# Patient Record
Sex: MALE | Race: NATIVE HAWAIIAN OR OTHER PACIFIC ISLANDER | NOT HISPANIC OR LATINO | Employment: FULL TIME | URBAN - METROPOLITAN AREA
[De-identification: names, ages, dates, MRNs, and addresses within clinical notes are randomized per-mention and may not be internally consistent; named-entity substitution may affect disease eponyms.]

---

## 2019-05-10 ENCOUNTER — APPOINTMENT (OUTPATIENT)
Dept: RADIOLOGY | Facility: MEDICAL CENTER | Age: 49
End: 2019-05-10
Attending: EMERGENCY MEDICINE

## 2019-05-10 ENCOUNTER — HOSPITAL ENCOUNTER (OUTPATIENT)
Facility: MEDICAL CENTER | Age: 49
End: 2019-05-12
Attending: EMERGENCY MEDICINE | Admitting: HOSPITALIST

## 2019-05-10 DIAGNOSIS — J18.9 PNEUMONIA OF RIGHT LOWER LOBE DUE TO INFECTIOUS ORGANISM: ICD-10-CM

## 2019-05-10 DIAGNOSIS — R11.2 NON-INTRACTABLE VOMITING WITH NAUSEA, UNSPECIFIED VOMITING TYPE: ICD-10-CM

## 2019-05-10 PROBLEM — A41.9 SEPSIS (HCC): Status: ACTIVE | Noted: 2019-05-10

## 2019-05-10 PROBLEM — R03.0 ELEVATED BLOOD PRESSURE READING: Status: ACTIVE | Noted: 2019-05-10

## 2019-05-10 LAB
ALBUMIN SERPL BCP-MCNC: 4.1 G/DL (ref 3.2–4.9)
ALBUMIN/GLOB SERPL: 1 G/DL
ALP SERPL-CCNC: 95 U/L (ref 30–99)
ALT SERPL-CCNC: 36 U/L (ref 2–50)
ANION GAP SERPL CALC-SCNC: 8 MMOL/L (ref 0–11.9)
APPEARANCE UR: CLEAR
AST SERPL-CCNC: 62 U/L (ref 12–45)
BACTERIA #/AREA URNS HPF: NEGATIVE /HPF
BASOPHILS # BLD AUTO: 0.4 % (ref 0–1.8)
BASOPHILS # BLD: 0.04 K/UL (ref 0–0.12)
BILIRUB SERPL-MCNC: 0.6 MG/DL (ref 0.1–1.5)
BILIRUB UR QL STRIP.AUTO: NEGATIVE
BUN SERPL-MCNC: 19 MG/DL (ref 8–22)
CALCIUM SERPL-MCNC: 9.3 MG/DL (ref 8.5–10.5)
CHLORIDE SERPL-SCNC: 100 MMOL/L (ref 96–112)
CO2 SERPL-SCNC: 26 MMOL/L (ref 20–33)
COLOR UR: YELLOW
CREAT SERPL-MCNC: 1.01 MG/DL (ref 0.5–1.4)
EOSINOPHIL # BLD AUTO: 0.08 K/UL (ref 0–0.51)
EOSINOPHIL NFR BLD: 0.7 % (ref 0–6.9)
EPI CELLS #/AREA URNS HPF: NEGATIVE /HPF
ERYTHROCYTE [DISTWIDTH] IN BLOOD BY AUTOMATED COUNT: 51.1 FL (ref 35.9–50)
FLUAV RNA SPEC QL NAA+PROBE: NEGATIVE
FLUBV RNA SPEC QL NAA+PROBE: NEGATIVE
GLOBULIN SER CALC-MCNC: 4.3 G/DL (ref 1.9–3.5)
GLUCOSE SERPL-MCNC: 112 MG/DL (ref 65–99)
GLUCOSE UR STRIP.AUTO-MCNC: NEGATIVE MG/DL
HCT VFR BLD AUTO: 44.9 % (ref 42–52)
HGB BLD-MCNC: 15.3 G/DL (ref 14–18)
HYALINE CASTS #/AREA URNS LPF: ABNORMAL /LPF
IMM GRANULOCYTES # BLD AUTO: 0.04 K/UL (ref 0–0.11)
IMM GRANULOCYTES NFR BLD AUTO: 0.4 % (ref 0–0.9)
KETONES UR STRIP.AUTO-MCNC: NEGATIVE MG/DL
LACTATE BLD-SCNC: 1.2 MMOL/L (ref 0.5–2)
LEUKOCYTE ESTERASE UR QL STRIP.AUTO: NEGATIVE
LYMPHOCYTES # BLD AUTO: 1.31 K/UL (ref 1–4.8)
LYMPHOCYTES NFR BLD: 11.6 % (ref 22–41)
MCH RBC QN AUTO: 32.3 PG (ref 27–33)
MCHC RBC AUTO-ENTMCNC: 34.1 G/DL (ref 33.7–35.3)
MCV RBC AUTO: 94.9 FL (ref 81.4–97.8)
MICRO URNS: ABNORMAL
MONOCYTES # BLD AUTO: 0.81 K/UL (ref 0–0.85)
MONOCYTES NFR BLD AUTO: 7.2 % (ref 0–13.4)
NEUTROPHILS # BLD AUTO: 8.98 K/UL (ref 1.82–7.42)
NEUTROPHILS NFR BLD: 79.7 % (ref 44–72)
NITRITE UR QL STRIP.AUTO: NEGATIVE
NRBC # BLD AUTO: 0 K/UL
NRBC BLD-RTO: 0 /100 WBC
PH UR STRIP.AUTO: 8 [PH]
PLATELET # BLD AUTO: 255 K/UL (ref 164–446)
PMV BLD AUTO: 8.3 FL (ref 9–12.9)
POTASSIUM SERPL-SCNC: 3.9 MMOL/L (ref 3.6–5.5)
PROCALCITONIN SERPL-MCNC: 0.07 NG/ML
PROT SERPL-MCNC: 8.4 G/DL (ref 6–8.2)
PROT UR QL STRIP: NEGATIVE MG/DL
RBC # BLD AUTO: 4.73 M/UL (ref 4.7–6.1)
RBC # URNS HPF: ABNORMAL /HPF
RBC UR QL AUTO: ABNORMAL
SODIUM SERPL-SCNC: 134 MMOL/L (ref 135–145)
SP GR UR STRIP.AUTO: 1.02
UROBILINOGEN UR STRIP.AUTO-MCNC: 1 MG/DL
WBC # BLD AUTO: 11.3 K/UL (ref 4.8–10.8)
WBC #/AREA URNS HPF: ABNORMAL /HPF

## 2019-05-10 PROCEDURE — 700111 HCHG RX REV CODE 636 W/ 250 OVERRIDE (IP): Performed by: HOSPITALIST

## 2019-05-10 PROCEDURE — 36415 COLL VENOUS BLD VENIPUNCTURE: CPT

## 2019-05-10 PROCEDURE — 99285 EMERGENCY DEPT VISIT HI MDM: CPT

## 2019-05-10 PROCEDURE — A9270 NON-COVERED ITEM OR SERVICE: HCPCS | Performed by: EMERGENCY MEDICINE

## 2019-05-10 PROCEDURE — 87040 BLOOD CULTURE FOR BACTERIA: CPT | Mod: 91

## 2019-05-10 PROCEDURE — 700111 HCHG RX REV CODE 636 W/ 250 OVERRIDE (IP): Performed by: EMERGENCY MEDICINE

## 2019-05-10 PROCEDURE — 71045 X-RAY EXAM CHEST 1 VIEW: CPT

## 2019-05-10 PROCEDURE — G0378 HOSPITAL OBSERVATION PER HR: HCPCS

## 2019-05-10 PROCEDURE — 87502 INFLUENZA DNA AMP PROBE: CPT

## 2019-05-10 PROCEDURE — 80053 COMPREHEN METABOLIC PANEL: CPT

## 2019-05-10 PROCEDURE — 96365 THER/PROPH/DIAG IV INF INIT: CPT

## 2019-05-10 PROCEDURE — 83605 ASSAY OF LACTIC ACID: CPT

## 2019-05-10 PROCEDURE — 74176 CT ABD & PELVIS W/O CONTRAST: CPT

## 2019-05-10 PROCEDURE — 87086 URINE CULTURE/COLONY COUNT: CPT

## 2019-05-10 PROCEDURE — 99220 PR INITIAL OBSERVATION CARE,LEVL III: CPT | Performed by: HOSPITALIST

## 2019-05-10 PROCEDURE — 700102 HCHG RX REV CODE 250 W/ 637 OVERRIDE(OP): Performed by: HOSPITALIST

## 2019-05-10 PROCEDURE — 84145 PROCALCITONIN (PCT): CPT

## 2019-05-10 PROCEDURE — 81001 URINALYSIS AUTO W/SCOPE: CPT

## 2019-05-10 PROCEDURE — 85025 COMPLETE CBC W/AUTO DIFF WBC: CPT

## 2019-05-10 PROCEDURE — 700102 HCHG RX REV CODE 250 W/ 637 OVERRIDE(OP): Performed by: EMERGENCY MEDICINE

## 2019-05-10 PROCEDURE — 700105 HCHG RX REV CODE 258: Performed by: EMERGENCY MEDICINE

## 2019-05-10 PROCEDURE — 96375 TX/PRO/DX INJ NEW DRUG ADDON: CPT

## 2019-05-10 PROCEDURE — 700105 HCHG RX REV CODE 258: Performed by: HOSPITALIST

## 2019-05-10 PROCEDURE — A9270 NON-COVERED ITEM OR SERVICE: HCPCS | Performed by: HOSPITALIST

## 2019-05-10 RX ORDER — AMOXICILLIN 250 MG
2 CAPSULE ORAL 2 TIMES DAILY
Status: DISCONTINUED | OUTPATIENT
Start: 2019-05-11 | End: 2019-05-12 | Stop reason: HOSPADM

## 2019-05-10 RX ORDER — SODIUM CHLORIDE 9 MG/ML
INJECTION, SOLUTION INTRAVENOUS CONTINUOUS
Status: DISPENSED | OUTPATIENT
Start: 2019-05-10 | End: 2019-05-11

## 2019-05-10 RX ORDER — POLYETHYLENE GLYCOL 3350 17 G/17G
1 POWDER, FOR SOLUTION ORAL
Status: DISCONTINUED | OUTPATIENT
Start: 2019-05-10 | End: 2019-05-12 | Stop reason: HOSPADM

## 2019-05-10 RX ORDER — GUAIFENESIN/DEXTROMETHORPHAN 100-10MG/5
10 SYRUP ORAL EVERY 6 HOURS PRN
Status: DISCONTINUED | OUTPATIENT
Start: 2019-05-10 | End: 2019-05-12 | Stop reason: HOSPADM

## 2019-05-10 RX ORDER — SODIUM CHLORIDE 9 MG/ML
500 INJECTION, SOLUTION INTRAVENOUS
Status: COMPLETED | OUTPATIENT
Start: 2019-05-10 | End: 2019-05-11

## 2019-05-10 RX ORDER — MORPHINE SULFATE 4 MG/ML
2-4 INJECTION, SOLUTION INTRAMUSCULAR; INTRAVENOUS EVERY 6 HOURS PRN
Status: DISCONTINUED | OUTPATIENT
Start: 2019-05-10 | End: 2019-05-12 | Stop reason: HOSPADM

## 2019-05-10 RX ORDER — ACETAMINOPHEN 500 MG
1000 TABLET ORAL ONCE
Status: COMPLETED | OUTPATIENT
Start: 2019-05-10 | End: 2019-05-10

## 2019-05-10 RX ORDER — TRAMADOL HYDROCHLORIDE 50 MG/1
50 TABLET ORAL EVERY 6 HOURS PRN
Status: DISCONTINUED | OUTPATIENT
Start: 2019-05-10 | End: 2019-05-12 | Stop reason: HOSPADM

## 2019-05-10 RX ORDER — AZITHROMYCIN 250 MG/1
250 TABLET, FILM COATED ORAL EVERY 24 HOURS
Status: DISCONTINUED | OUTPATIENT
Start: 2019-05-11 | End: 2019-05-11

## 2019-05-10 RX ORDER — BISACODYL 10 MG
10 SUPPOSITORY, RECTAL RECTAL
Status: DISCONTINUED | OUTPATIENT
Start: 2019-05-10 | End: 2019-05-12 | Stop reason: HOSPADM

## 2019-05-10 RX ORDER — ENALAPRILAT 1.25 MG/ML
1.25 INJECTION INTRAVENOUS EVERY 6 HOURS PRN
Status: DISCONTINUED | OUTPATIENT
Start: 2019-05-10 | End: 2019-05-12 | Stop reason: HOSPADM

## 2019-05-10 RX ORDER — AZITHROMYCIN 250 MG/1
500 TABLET, FILM COATED ORAL ONCE
Status: COMPLETED | OUTPATIENT
Start: 2019-05-10 | End: 2019-05-10

## 2019-05-10 RX ORDER — ACETAMINOPHEN 325 MG/1
650 TABLET ORAL EVERY 6 HOURS PRN
Status: DISCONTINUED | OUTPATIENT
Start: 2019-05-10 | End: 2019-05-12 | Stop reason: HOSPADM

## 2019-05-10 RX ORDER — ACETAMINOPHEN 325 MG/1
325 TABLET ORAL EVERY 6 HOURS PRN
COMMUNITY

## 2019-05-10 RX ORDER — SODIUM CHLORIDE 9 MG/ML
30 INJECTION, SOLUTION INTRAVENOUS ONCE
Status: COMPLETED | OUTPATIENT
Start: 2019-05-10 | End: 2019-05-10

## 2019-05-10 RX ORDER — AZITHROMYCIN 500 MG/1
500 INJECTION, POWDER, LYOPHILIZED, FOR SOLUTION INTRAVENOUS ONCE
Status: DISCONTINUED | OUTPATIENT
Start: 2019-05-10 | End: 2019-05-10

## 2019-05-10 RX ADMIN — TRAMADOL HYDROCHLORIDE 50 MG: 50 TABLET, FILM COATED ORAL at 22:25

## 2019-05-10 RX ADMIN — ACETAMINOPHEN 1000 MG: 500 TABLET ORAL at 16:04

## 2019-05-10 RX ADMIN — MORPHINE SULFATE 4 MG: 4 INJECTION INTRAVENOUS at 20:57

## 2019-05-10 RX ADMIN — SODIUM CHLORIDE: 9 INJECTION, SOLUTION INTRAVENOUS at 20:57

## 2019-05-10 RX ADMIN — AMPICILLIN AND SULBACTAM 3 G: 2; 1 INJECTION, POWDER, FOR SOLUTION INTRAVENOUS at 18:36

## 2019-05-10 RX ADMIN — SODIUM CHLORIDE 3840 ML: 9 INJECTION, SOLUTION INTRAVENOUS at 16:06

## 2019-05-10 RX ADMIN — AZITHROMYCIN 500 MG: 250 TABLET, FILM COATED ORAL at 18:53

## 2019-05-10 RX ADMIN — ACETAMINOPHEN 650 MG: 325 TABLET, FILM COATED ORAL at 22:25

## 2019-05-10 RX ADMIN — AMPICILLIN SODIUM AND SULBACTAM SODIUM 3 G: 2; 1 INJECTION, POWDER, FOR SOLUTION INTRAMUSCULAR; INTRAVENOUS at 23:47

## 2019-05-10 ASSESSMENT — ENCOUNTER SYMPTOMS
CHILLS: 1
VOMITING: 1
DIARRHEA: 0
SHORTNESS OF BREATH: 0
DEPRESSION: 0
WHEEZING: 0
FOCAL WEAKNESS: 0
PHOTOPHOBIA: 0
MYALGIAS: 0
COUGH: 0
ABDOMINAL PAIN: 0
SORE THROAT: 0
FEVER: 1
BACK PAIN: 1
TINGLING: 0
PALPITATIONS: 0
HEADACHES: 0
DIZZINESS: 0
NAUSEA: 1

## 2019-05-10 ASSESSMENT — PAIN DESCRIPTION - DESCRIPTORS: DESCRIPTORS: ACHING

## 2019-05-10 ASSESSMENT — LIFESTYLE VARIABLES
EVER_SMOKED: NEVER
DO YOU DRINK ALCOHOL: NO

## 2019-05-10 ASSESSMENT — PATIENT HEALTH QUESTIONNAIRE - PHQ9
SUM OF ALL RESPONSES TO PHQ9 QUESTIONS 1 AND 2: 0
2. FEELING DOWN, DEPRESSED, IRRITABLE, OR HOPELESS: NOT AT ALL
1. LITTLE INTEREST OR PLEASURE IN DOING THINGS: NOT AT ALL

## 2019-05-10 ASSESSMENT — COPD QUESTIONNAIRES
DO YOU EVER COUGH UP ANY MUCUS OR PHLEGM?: NO/ONLY WITH OCCASIONAL COLDS OR INFECTIONS
DURING THE PAST 4 WEEKS HOW MUCH DID YOU FEEL SHORT OF BREATH: SOME OF THE TIME
HAVE YOU SMOKED AT LEAST 100 CIGARETTES IN YOUR ENTIRE LIFE: NO/DON'T KNOW
COPD SCREENING SCORE: 2

## 2019-05-10 NOTE — ED PROVIDER NOTES
"ED Provider Note    CHIEF COMPLAINT  Chief Complaint   Patient presents with   • Abdominal Pain   • Flank Pain   • Fever       HPI  Alexis Hart is a 49 y.o. male who presents for evaluation of fever and flank pain.  Patient states that over the last couple days he has been having fever and chills along with generalized malaise myalgias arthralgias with bilateral flank pain.  He also states he has had a fairly dry nonproductive cough associated with this.  Patient denies: Nasal congestion, purulent rhinorrhea, hemoptysis, purulent sputum, chest pain, vomiting, hematemesis, melena hematochezia, diarrhea.  He states he has had some polyuria.  No recent travel.  No recent antibiotic use.  No other acute symptomatology or complaints.    REVIEW OF SYSTEMS  See HPI for further details.  He denies health problems such as: Hypertension, diabetes, thyroid dysfunction, seizures, cardiopulmonary disorders, gastrointestinal disorders, genitourinary disorders.  All other systems negative.    PAST MEDICAL HISTORY  None    FAMILY HISTORY  History reviewed. No pertinent family history.    SOCIAL HISTORY  Patient denies alcohol or drug use; non-smoker;    SURGICAL HISTORY  Past Surgical History:   Procedure Laterality Date   • KNEE ARTHROSCOPY Right        CURRENT MEDICATIONS  1.  Ibuprofen on a as needed basis    ALLERGIES  No Known Allergies    PHYSICAL EXAM  VITAL SIGNS: BP (!) 161/76   Pulse (!) 101   Temp (!) 39.6 °C (103.3 °F) (Temporal)   Resp (!) 24   Ht 1.575 m (5' 2\")   Wt (!) 128 kg (282 lb 3 oz)   BMI 51.61 kg/m²    Constitutional: 49-year-old male, obese, well developed, Well nourished, appears weak and mildly uncomfortable but oriented x3  HENT: ,Atraumatic, Bilateral external ears normal, tympanic membranes clear, Oropharynx mildly dry, No oral exudates, Nose normal.   Eyes: PERRL, EOMI, Conjunctiva normal, No discharge.   Neck: Normal range of motion, No tenderness, Supple, No stridor.   Lymphatic: No " lymphadenopathy noted.   Cardiovascular: Tachycardic heart rate, Normal rhythm, No murmurs, No rubs, No gallops.   Thorax & Lungs: Normal Equal breath sounds, No respiratory distress, No wheezing, no stridor, no rales. No chest tenderness.   Abdomen: Soft, nontender, nondistended, no organomegaly, positive bowel sounds normal in quality. No guarding or rebound.  Skin: Mildly decreased skin turgor, pink, warm, dry. No rashes, petechiae, purpura. Normal capillary refill.   Back: No tenderness, No CVA tenderness.   Genitalia: External genitalia appear normal, No masses or lesions. No discharge. Nontender  Extremities: Intact distal pulses, No edema, No tenderness, No cyanosis, No clubbing. Vascular: Pulses are 2+, symmetric in the upper and lower extremities.  Musculoskeletal: Good range of motion in all major joints. No tenderness to palpation or major deformities noted.   Neurologic: Alert & oriented x 3, Normal motor function, Normal sensory function, No gross focal deficits noted.   Psychiatric: Affect normal, Judgment normal, Mood normal. =    RADIOLOGY/PROCEDURES  CT-RENAL COLIC EVALUATION(A/P W/O)   Final Result      1.   No renal or ureteral calculus or obstruction.      2.  Right lower lobe pneumonitis.      DX-CHEST-PORTABLE (1 VIEW)   Final Result      No acute cardiopulmonary disease evident.            COURSE & MEDICAL DECISION MAKING  Pertinent Labs & Imaging studies reviewed. (See chart for details)  1.  Monitor  2.  IV normal saline; IV fluids administered for sepsis protocol as patient presents febrile and tachycardic mildly tachypneic; this is not amenable or appropriate to attempt to treat with just oral rehydration; reevaluation revealed stable status;  3.  Azithromycin  4.  Unasyn    Laboratory studies: CBC shows white count of 11.3, 79% neutrophils, 11% lymphocytes, 7% monocytes, hemoglobin 15.3, hematocrit 44.9; CMP shows a random glucose of 112, AST 62, protein 8.4, globulin 4.3, otherwise  within normal; lactic acid 1.2; urinalysis negative; influenza is negative;    Discussion/consultation: This time, the patient presents for evaluation of fever.  Patient complains of flank pain but CT scan the abdomen is unremarkable.  We did notice a right lower lobe infiltrate on CT scan which was not identified on plain radiographic studies.  Patient presented tachycardic and febrile.  The patient was treated per sepsis protocol.  Patient remained stable in the emerge department.  I spoke with the hospitalist on-call.  The patient will be admitted for further monitoring, treatment, and care.    FINAL IMPRESSION  1. Pneumonia of right lower lobe due to infectious organism (HCC)    2. Non-intractable vomiting with nausea, unspecified vomiting type           PLAN  1.  Patient will be admitted for further monitoring, treatment, and care.    Electronically signed by: Guy G Gansert, 5/10/2019 2:44 PM

## 2019-05-10 NOTE — ED NOTES
Assist RN: attempted to re-sight IV. Not able to established 2nd IV at this time. Primary RN notified.

## 2019-05-10 NOTE — ED TRIAGE NOTES
Patient to ED via EMS with complaints of abdominal pain and right flank tenderness x2 days. Increased pain today. +nauesa, vomiting and diarrhea. Denies blood in vomit or stools. No significant medical hx. +fevers at home. Temp on arrivel was 39.6C. Sepsis screening +. Received 100 mch fentanyl and 4 of Zofran PTa

## 2019-05-11 ENCOUNTER — APPOINTMENT (OUTPATIENT)
Dept: RADIOLOGY | Facility: MEDICAL CENTER | Age: 49
End: 2019-05-11
Attending: INTERNAL MEDICINE

## 2019-05-11 PROBLEM — J18.9 SEPSIS DUE TO PNEUMONIA (HCC): Status: ACTIVE | Noted: 2019-05-10

## 2019-05-11 LAB
ANION GAP SERPL CALC-SCNC: 7 MMOL/L (ref 0–11.9)
BUN SERPL-MCNC: 16 MG/DL (ref 8–22)
CALCIUM SERPL-MCNC: 8.1 MG/DL (ref 8.5–10.5)
CHLORIDE SERPL-SCNC: 103 MMOL/L (ref 96–112)
CO2 SERPL-SCNC: 22 MMOL/L (ref 20–33)
CREAT SERPL-MCNC: 0.94 MG/DL (ref 0.5–1.4)
ERYTHROCYTE [DISTWIDTH] IN BLOOD BY AUTOMATED COUNT: 53.7 FL (ref 35.9–50)
GLUCOSE SERPL-MCNC: 92 MG/DL (ref 65–99)
GRAM STN SPEC: NORMAL
HCT VFR BLD AUTO: 41.8 % (ref 42–52)
HGB BLD-MCNC: 13.8 G/DL (ref 14–18)
LACTATE BLD-SCNC: 1.1 MMOL/L (ref 0.5–2)
MCH RBC QN AUTO: 31.9 PG (ref 27–33)
MCHC RBC AUTO-ENTMCNC: 33 G/DL (ref 33.7–35.3)
MCV RBC AUTO: 96.5 FL (ref 81.4–97.8)
PLATELET # BLD AUTO: 181 K/UL (ref 164–446)
PMV BLD AUTO: 8.5 FL (ref 9–12.9)
POTASSIUM SERPL-SCNC: 3.9 MMOL/L (ref 3.6–5.5)
RBC # BLD AUTO: 4.33 M/UL (ref 4.7–6.1)
SIGNIFICANT IND 70042: NORMAL
SITE SITE: NORMAL
SODIUM SERPL-SCNC: 132 MMOL/L (ref 135–145)
SOURCE SOURCE: NORMAL
WBC # BLD AUTO: 18.7 K/UL (ref 4.8–10.8)

## 2019-05-11 PROCEDURE — G0378 HOSPITAL OBSERVATION PER HR: HCPCS

## 2019-05-11 PROCEDURE — 87205 SMEAR GRAM STAIN: CPT

## 2019-05-11 PROCEDURE — 96367 TX/PROPH/DG ADDL SEQ IV INF: CPT

## 2019-05-11 PROCEDURE — 85027 COMPLETE CBC AUTOMATED: CPT

## 2019-05-11 PROCEDURE — 700105 HCHG RX REV CODE 258: Performed by: HOSPITALIST

## 2019-05-11 PROCEDURE — 700102 HCHG RX REV CODE 250 W/ 637 OVERRIDE(OP): Performed by: HOSPITALIST

## 2019-05-11 PROCEDURE — 80048 BASIC METABOLIC PNL TOTAL CA: CPT

## 2019-05-11 PROCEDURE — 700101 HCHG RX REV CODE 250: Performed by: HOSPITALIST

## 2019-05-11 PROCEDURE — A9270 NON-COVERED ITEM OR SERVICE: HCPCS | Performed by: INTERNAL MEDICINE

## 2019-05-11 PROCEDURE — 83605 ASSAY OF LACTIC ACID: CPT

## 2019-05-11 PROCEDURE — 99226 PR SUBSEQUENT OBSERVATION CARE,LEVEL III: CPT | Performed by: INTERNAL MEDICINE

## 2019-05-11 PROCEDURE — 96366 THER/PROPH/DIAG IV INF ADDON: CPT

## 2019-05-11 PROCEDURE — A9270 NON-COVERED ITEM OR SERVICE: HCPCS | Performed by: HOSPITALIST

## 2019-05-11 PROCEDURE — 72100 X-RAY EXAM L-S SPINE 2/3 VWS: CPT

## 2019-05-11 PROCEDURE — 700111 HCHG RX REV CODE 636 W/ 250 OVERRIDE (IP): Performed by: HOSPITALIST

## 2019-05-11 PROCEDURE — 87070 CULTURE OTHR SPECIMN AEROBIC: CPT

## 2019-05-11 PROCEDURE — 700102 HCHG RX REV CODE 250 W/ 637 OVERRIDE(OP): Performed by: INTERNAL MEDICINE

## 2019-05-11 PROCEDURE — 36415 COLL VENOUS BLD VENIPUNCTURE: CPT

## 2019-05-11 PROCEDURE — 96376 TX/PRO/DX INJ SAME DRUG ADON: CPT

## 2019-05-11 PROCEDURE — 87077 CULTURE AEROBIC IDENTIFY: CPT

## 2019-05-11 RX ORDER — SODIUM CHLORIDE 9 MG/ML
500 INJECTION, SOLUTION INTRAVENOUS
Status: COMPLETED | OUTPATIENT
Start: 2019-05-11 | End: 2019-05-11

## 2019-05-11 RX ORDER — DOXYCYCLINE 100 MG/1
100 TABLET ORAL EVERY 12 HOURS
Status: DISCONTINUED | OUTPATIENT
Start: 2019-05-11 | End: 2019-05-12 | Stop reason: HOSPADM

## 2019-05-11 RX ADMIN — ACETAMINOPHEN 650 MG: 325 TABLET, FILM COATED ORAL at 04:40

## 2019-05-11 RX ADMIN — AMPICILLIN SODIUM AND SULBACTAM SODIUM 3 G: 2; 1 INJECTION, POWDER, FOR SOLUTION INTRAMUSCULAR; INTRAVENOUS at 18:44

## 2019-05-11 RX ADMIN — TRAMADOL HYDROCHLORIDE 50 MG: 50 TABLET, FILM COATED ORAL at 21:57

## 2019-05-11 RX ADMIN — AMPICILLIN SODIUM AND SULBACTAM SODIUM 3 G: 2; 1 INJECTION, POWDER, FOR SOLUTION INTRAMUSCULAR; INTRAVENOUS at 04:40

## 2019-05-11 RX ADMIN — TRAMADOL HYDROCHLORIDE 50 MG: 50 TABLET, FILM COATED ORAL at 04:40

## 2019-05-11 RX ADMIN — MORPHINE SULFATE 4 MG: 4 INJECTION INTRAVENOUS at 09:00

## 2019-05-11 RX ADMIN — SODIUM CHLORIDE: 9 INJECTION, SOLUTION INTRAVENOUS at 10:18

## 2019-05-11 RX ADMIN — DOXYCYCLINE 100 MG: 100 INJECTION, POWDER, LYOPHILIZED, FOR SOLUTION INTRAVENOUS at 06:17

## 2019-05-11 RX ADMIN — DOXYCYCLINE 100 MG: 100 TABLET, FILM COATED ORAL at 18:45

## 2019-05-11 RX ADMIN — AMPICILLIN SODIUM AND SULBACTAM SODIUM 3 G: 2; 1 INJECTION, POWDER, FOR SOLUTION INTRAMUSCULAR; INTRAVENOUS at 12:24

## 2019-05-11 RX ADMIN — SENNOSIDES, DOCUSATE SODIUM 2 TABLET: 50; 8.6 TABLET, FILM COATED ORAL at 04:40

## 2019-05-11 RX ADMIN — SENNOSIDES, DOCUSATE SODIUM 2 TABLET: 50; 8.6 TABLET, FILM COATED ORAL at 18:45

## 2019-05-11 ASSESSMENT — ENCOUNTER SYMPTOMS
PALPITATIONS: 0
CONSTIPATION: 0
INSOMNIA: 0
ABDOMINAL PAIN: 0
NERVOUS/ANXIOUS: 0
EYE PAIN: 0
SHORTNESS OF BREATH: 0
NAUSEA: 1
DIZZINESS: 0
FALLS: 0
HEADACHES: 0
BACK PAIN: 1
BLOOD IN STOOL: 0
DIARRHEA: 0
COUGH: 0
LOSS OF CONSCIOUSNESS: 0
VOMITING: 1
HEMOPTYSIS: 0
DIAPHORESIS: 1
WEAKNESS: 1
EYE REDNESS: 0
FOCAL WEAKNESS: 0
WHEEZING: 0
TREMORS: 0
SEIZURES: 0
CHILLS: 1
FEVER: 1
MYALGIAS: 1

## 2019-05-11 ASSESSMENT — COGNITIVE AND FUNCTIONAL STATUS - GENERAL
MOBILITY SCORE: 21
SUGGESTED CMS G CODE MODIFIER DAILY ACTIVITY: CH
STANDING UP FROM CHAIR USING ARMS: A LITTLE
DAILY ACTIVITIY SCORE: 24
SUGGESTED CMS G CODE MODIFIER MOBILITY: CJ
WALKING IN HOSPITAL ROOM: A LITTLE
CLIMB 3 TO 5 STEPS WITH RAILING: A LITTLE

## 2019-05-11 ASSESSMENT — PATIENT HEALTH QUESTIONNAIRE - PHQ9
2. FEELING DOWN, DEPRESSED, IRRITABLE, OR HOPELESS: SEVERAL DAYS
SUM OF ALL RESPONSES TO PHQ QUESTIONS 1-9: 7
4. FEELING TIRED OR HAVING LITTLE ENERGY: NOT AT ALL
9. THOUGHTS THAT YOU WOULD BE BETTER OFF DEAD, OR OF HURTING YOURSELF: NOT AT ALL
7. TROUBLE CONCENTRATING ON THINGS, SUCH AS READING THE NEWSPAPER OR WATCHING TELEVISION: SEVERAL DAYS
SUM OF ALL RESPONSES TO PHQ9 QUESTIONS 1 AND 2: 2
3. TROUBLE FALLING OR STAYING ASLEEP OR SLEEPING TOO MUCH: MORE THAN HALF THE DAYS
6. FEELING BAD ABOUT YOURSELF - OR THAT YOU ARE A FAILURE OR HAVE LET YOURSELF OR YOUR FAMILY DOWN: MORE THAN HALF THE DAYS
5. POOR APPETITE OR OVEREATING: NOT AT ALL
1. LITTLE INTEREST OR PLEASURE IN DOING THINGS: SEVERAL DAYS
8. MOVING OR SPEAKING SO SLOWLY THAT OTHER PEOPLE COULD HAVE NOTICED. OR THE OPPOSITE, BEING SO FIGETY OR RESTLESS THAT YOU HAVE BEEN MOVING AROUND A LOT MORE THAN USUAL: NOT AT ALL

## 2019-05-11 NOTE — PROGRESS NOTES
Patient up to floor from ED.  A&O*4, patient tachypneic, tachycardic, and febrile with a MEWs of 5.  Sepsis bolus continued, SCDs applied.

## 2019-05-11 NOTE — ASSESSMENT & PLAN NOTE
Patient is not hypoxic, he has no hypotension, there is no evidence of endorgan damage.    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  See below.

## 2019-05-11 NOTE — ED NOTES
Med Rec updated and complete per pt at bedside  Allergies have been verified   No oral ABX within the last 30 days  Pt Home Pharmacy:[facility]

## 2019-05-11 NOTE — PROGRESS NOTES
Dr. Patino contacted about patient's MEWs score of 6.  Order received to add Doxycycline 100 mg IV Q12 hours to patient's MAR.

## 2019-05-11 NOTE — CARE PLAN
Problem: Safety  Goal: Will remain free from falls  Outcome: PROGRESSING AS EXPECTED    Intervention: Implement fall precautions  Bed in low position, wheels locked, call light within reach, hourly rounding in place.      Problem: Venous Thromboembolism (VTW)/Deep Vein Thrombosis (DVT) Prevention:  Goal: Patient will participate in Venous Thrombosis (VTE)/Deep Vein Thrombosis (DVT)Prevention Measures  Outcome: PROGRESSING AS EXPECTED    Intervention: Assess and monitor for anticoagulation complications  No complications noted.  Intervention: Ensure patient wears graduated elastic stockings (DINESH hose) and/or SCDs, if ordered, when in bed or chair (Remove at least once per shift for skin check)  SCDs placed.  Intervention: Encourage ambulation/mobilization at level directed by Physical Therapy in collaboration with Interdisciplinary Team  Patient ambulated from gurney to bed without assistance or DME.

## 2019-05-11 NOTE — DIETARY
NUTRITION SERVICES: BMI - Pt with BMI >40 (=Body mass index is 52.8 kg/m² - obese.). Weight loss counseling not appropriate in acute care setting.     RECOMMEND - Referral to outpatient nutrition services for weight management after D/C.

## 2019-05-11 NOTE — PROGRESS NOTES
2 RN skin check complete with Yo FLOOD.  2 Small wounds to left distal lower extremity, per patient they are from old mosquito bites, and 1 small healing wound to patient's left hand which per patient is from a burn at work.  No other skin breakdown noted.

## 2019-05-11 NOTE — ASSESSMENT & PLAN NOTE
Patient denies history of hypertension.  Monitor with PRN IV antihypertensives for now, if he has continued need then we will plan to start him on standing medications.  NOw on the low normal side.

## 2019-05-11 NOTE — PROGRESS NOTES
A&O, VSS, AZUL, =.  O2 in place at 0.5 lpm.  C/O pain to lower lungs at 7/10, medicated per mar.  IVF infusing.  SBA with ambulation, gait is steady.  Voiding WDL.  POC discussed, pt agrees and verbalizes understanding.  Hourly rounding in place, call light is within reach.  Assessment completed as charted.

## 2019-05-11 NOTE — PROGRESS NOTES
Patient's MEWs score 5, temperature of 102.7f, pulse 107 and respirations of 25.  Patient grimacing and complaining of pain to ABD 8/10.  Dr. Gutierrez notified.  Order received for Morphine IV 2-4 mg Q6 PRN severe pain.

## 2019-05-11 NOTE — CARE PLAN
Problem: Safety  Goal: Will remain free from falls    Intervention: Assess risk factors for falls  Fall risk assessment completed.        Problem: Pain Management  Goal: Pain level will decrease to patient's comfort goal    Intervention: Follow pain managment plan developed in collaboration with patient and Interdisciplinary Team  Medicated for pain per mar.

## 2019-05-11 NOTE — ASSESSMENT & PLAN NOTE
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

## 2019-05-11 NOTE — H&P
Hospital Medicine History & Physical Note    Date of Service  5/10/2019    Primary Care Physician  No primary care provider on file.    Consultants  None    Code Status  Full    Chief Complaint  Chief Complaint   Patient presents with   • Abdominal Pain   • Flank Pain   • Fever       History of Presenting Illness  49 y.o. male who presented on 5/10/2019 with multiple complaints including subjective fevers, chills, back pain, nausea, and vomiting.  This is a otherwise healthy middle-aged male who reports no medical problems and states that his symptoms began approximately 2 to 3 days ago.  He initially had a lower abdominal pain which gradually became a right-sided flank pain, he denies any alleviating or aggravating factors but it was associated with nausea, vomiting, and an episode of diarrhea.  He then developed subjective fevers with chills and has had a body aches and malaise.  He reports a dry nonproductive cough but otherwise denies any rhinorrhea, or congestion.  He has had no headache, chest pain, shortness of breath, or dysuria but does report polyuria.    Review of Systems  Review of Systems   Constitutional: Positive for chills and fever.   HENT: Negative for congestion and sore throat.    Eyes: Negative for photophobia.   Respiratory: Negative for cough, shortness of breath and wheezing.    Cardiovascular: Negative for chest pain and palpitations.   Gastrointestinal: Positive for nausea and vomiting. Negative for abdominal pain and diarrhea.   Genitourinary: Negative for dysuria.   Musculoskeletal: Positive for back pain. Negative for myalgias.   Skin: Negative.    Neurological: Negative for dizziness, tingling, focal weakness and headaches.   Psychiatric/Behavioral: Negative for depression and suicidal ideas.       Past Medical History  Patient denies    Surgical History  Past Surgical History:   Procedure Laterality Date   • KNEE ARTHROSCOPY Right        Family History  History reviewed. No pertinent  family history.    Social History  Social History   Substance Use Topics   • Smoking status: Never Smoker   • Smokeless tobacco: Current User   • Alcohol use No       Allergies  No Known Allergies    Medications  No current facility-administered medications on file prior to encounter.      No current outpatient prescriptions on file prior to encounter.       Physical Exam  Hemodynamics  Temp (24hrs), Av.6 °C (103.3 °F), Min:39.6 °C (103.2 °F), Max:39.6 °C (103.3 °F)   Temperature: (!) 39.6 °C (103.2 °F)  Pulse  Av.7  Min: 101  Max: 121 Heart Rate (Monitored): (!) 109  Blood Pressure: (!) 161/76, NIBP: (!) 82/60      Respiratory      Respiration: (!) 22, Pulse Oximetry: 92 %             Physical Exam   Constitutional: He is oriented to person, place, and time. No distress.   HENT:   Head: Normocephalic and atraumatic.   Right Ear: External ear normal.   Left Ear: External ear normal.   Eyes: EOM are normal. Right eye exhibits no discharge. Left eye exhibits no discharge.   Neck: Neck supple. No JVD present.   Cardiovascular: Normal rate, regular rhythm and normal heart sounds.    Pulmonary/Chest: Effort normal and breath sounds normal. No respiratory distress. He exhibits no tenderness.   Abdominal: Soft. Bowel sounds are normal. He exhibits no distension. There is no tenderness.   Musculoskeletal: He exhibits no edema.   Neurological: He is alert and oriented to person, place, and time. No cranial nerve deficit.   Skin: Skin is warm and dry. He is not diaphoretic. No erythema.   Psychiatric: He has a normal mood and affect. His behavior is normal.   Nursing note and vitals reviewed.    Capillary refill less than 3 seconds, distal pulses intact    Laboratory:  Recent Labs      05/10/19   1510   WBC  11.3*   RBC  4.73   HEMOGLOBIN  15.3   HEMATOCRIT  44.9   MCV  94.9   MCH  32.3   MCHC  34.1   RDW  51.1*   PLATELETCT  255   MPV  8.3*     Recent Labs      05/10/19   1510   SODIUM  134*   POTASSIUM  3.9    CHLORIDE  100   CO2  26   GLUCOSE  112*   BUN  19   CREATININE  1.01   CALCIUM  9.3     Recent Labs      05/10/19   1510   ALTSGPT  36   ASTSGOT  62*   ALKPHOSPHAT  95   TBILIRUBIN  0.6   GLUCOSE  112*                 No results found for: TROPONINI    Imaging  Ct-renal Colic Evaluation(a/p W/o)    Result Date: 5/10/2019  5/10/2019 5:18 PM HISTORY/REASON FOR EXAM:  ABDOMINAL PAIN; FLANK PAIN; FEVER. TECHNIQUE/EXAM DESCRIPTION AND NUMBER OF VIEWS: CT scan renal/colic without contrast. 5 mm helical images of the abdomen and pelvis were obtained from the diaphragmatic domes through the pubic symphysis. Low dose optimization technique was utilized for this CT exam including automated exposure control and adjustment of the mA and/or kV according to patient size. COMPARISON: None. FINDINGS: There is a moderate bilobulated ovoid opacity in the right lung base.     1.   No renal or ureteral calculus or obstruction. 2.  Right lower lobe pneumonitis.    Dx-chest-portable (1 View)    Result Date: 5/10/2019  5/10/2019 3:01 PM HISTORY/REASON FOR EXAM:  Abdominal and flank pain x2 days. TECHNIQUE/EXAM DESCRIPTION AND NUMBER OF VIEWS: Single portable view of the chest. COMPARISON: None FINDINGS: The soft tissues and bony structures are unremarkable. The heart and mediastinal structures are within normal limits. Pulmonary vascularity is normal. The lung fields are clear. There is no effusion or pneumothorax.     No acute cardiopulmonary disease evident.        Assessment/Plan:  Anticipate that patient will need less than 2 midnights for management of the discussed medical issues.    * CAP (community acquired pneumonia)   Assessment & Plan    Patient is febrile, he is tachycardic, he has leukocytosis and CT scan Shows a right lower lobe pneumonia.  Otherwise he has no lactic acidosis, his blood pressure is elevated and there is no evidence of hypotension at present.  The patient will be admitted to the hospital and started on  empiric antibiotic therapies with IV Unasyn and azithromycin.  He will receive an incentive spirometer and he will receive symptomatic management for his cough.  I will check a procalcitonin level, sputum sample, and will monitor these along with blood cultures in order to guide our antibiotic choices.     Elevated blood pressure reading   Assessment & Plan    Patient denies history of hypertension.  Monitor with PRN IV antihypertensives for now, if he has continued need then we will plan to start him on standing medications.     Sepsis (HCC)   Assessment & Plan    Patient is not hypoxic, he has no hypotension, there is no evidence of endorgan damage.  Treatment as noted above.         Prophylaxis: Sequential compression devices for DVT prophylaxis, no PPI indicated, bowel protocol as needed

## 2019-05-12 VITALS
SYSTOLIC BLOOD PRESSURE: 109 MMHG | DIASTOLIC BLOOD PRESSURE: 66 MMHG | RESPIRATION RATE: 16 BRPM | BODY MASS INDEX: 53.75 KG/M2 | HEIGHT: 62 IN | OXYGEN SATURATION: 92 % | WEIGHT: 292.11 LBS | TEMPERATURE: 97.3 F | HEART RATE: 74 BPM

## 2019-05-12 LAB
BACTERIA UR CULT: NORMAL
ERYTHROCYTE [DISTWIDTH] IN BLOOD BY AUTOMATED COUNT: 50 FL (ref 35.9–50)
HCT VFR BLD AUTO: 40.2 % (ref 42–52)
HGB BLD-MCNC: 13.6 G/DL (ref 14–18)
MCH RBC QN AUTO: 32 PG (ref 27–33)
MCHC RBC AUTO-ENTMCNC: 33.8 G/DL (ref 33.7–35.3)
MCV RBC AUTO: 94.6 FL (ref 81.4–97.8)
PLATELET # BLD AUTO: 181 K/UL (ref 164–446)
PMV BLD AUTO: 8.7 FL (ref 9–12.9)
RBC # BLD AUTO: 4.25 M/UL (ref 4.7–6.1)
SIGNIFICANT IND 70042: NORMAL
SITE SITE: NORMAL
SOURCE SOURCE: NORMAL
WBC # BLD AUTO: 9.2 K/UL (ref 4.8–10.8)

## 2019-05-12 PROCEDURE — A9270 NON-COVERED ITEM OR SERVICE: HCPCS | Performed by: INTERNAL MEDICINE

## 2019-05-12 PROCEDURE — 36415 COLL VENOUS BLD VENIPUNCTURE: CPT

## 2019-05-12 PROCEDURE — 96366 THER/PROPH/DIAG IV INF ADDON: CPT

## 2019-05-12 PROCEDURE — 700102 HCHG RX REV CODE 250 W/ 637 OVERRIDE(OP): Performed by: INTERNAL MEDICINE

## 2019-05-12 PROCEDURE — G0378 HOSPITAL OBSERVATION PER HR: HCPCS

## 2019-05-12 PROCEDURE — 85027 COMPLETE CBC AUTOMATED: CPT

## 2019-05-12 PROCEDURE — 700105 HCHG RX REV CODE 258

## 2019-05-12 PROCEDURE — A9270 NON-COVERED ITEM OR SERVICE: HCPCS | Performed by: HOSPITALIST

## 2019-05-12 PROCEDURE — 700105 HCHG RX REV CODE 258: Performed by: HOSPITALIST

## 2019-05-12 PROCEDURE — 99217 PR OBSERVATION CARE DISCHARGE: CPT | Performed by: INTERNAL MEDICINE

## 2019-05-12 PROCEDURE — 700102 HCHG RX REV CODE 250 W/ 637 OVERRIDE(OP): Performed by: HOSPITALIST

## 2019-05-12 PROCEDURE — 700111 HCHG RX REV CODE 636 W/ 250 OVERRIDE (IP): Performed by: HOSPITALIST

## 2019-05-12 RX ORDER — POLYETHYLENE GLYCOL 3350 17 G/17G
POWDER, FOR SOLUTION ORAL
Refills: 3 | COMMUNITY
Start: 2019-05-12

## 2019-05-12 RX ORDER — AMOXICILLIN AND CLAVULANATE POTASSIUM 875; 125 MG/1; MG/1
1 TABLET, FILM COATED ORAL 2 TIMES DAILY
Qty: 10 TAB | Refills: 0 | Status: SHIPPED | OUTPATIENT
Start: 2019-05-12 | End: 2019-05-17

## 2019-05-12 RX ORDER — SODIUM CHLORIDE 9 MG/ML
INJECTION, SOLUTION INTRAVENOUS
Status: COMPLETED
Start: 2019-05-12 | End: 2019-05-12

## 2019-05-12 RX ORDER — ALBUTEROL SULFATE 90 UG/1
2 AEROSOL, METERED RESPIRATORY (INHALATION) EVERY 6 HOURS PRN
Qty: 8.5 G | Refills: 0 | Status: SHIPPED | OUTPATIENT
Start: 2019-05-12

## 2019-05-12 RX ORDER — NICOTINE 14MG/24HR
1 PATCH, TRANSDERMAL 24 HOURS TRANSDERMAL 2 TIMES DAILY WITH MEALS
Qty: 14 CAP | Refills: 0 | Status: SHIPPED | OUTPATIENT
Start: 2019-05-12

## 2019-05-12 RX ORDER — DOXYCYCLINE 100 MG/1
100 TABLET ORAL EVERY 12 HOURS
Qty: 10 TAB | Refills: 0 | Status: SHIPPED | OUTPATIENT
Start: 2019-05-12 | End: 2019-05-17

## 2019-05-12 RX ORDER — AMOXICILLIN 250 MG
2 CAPSULE ORAL 2 TIMES DAILY
Qty: 30 TAB | Refills: 0 | Status: SHIPPED | OUTPATIENT
Start: 2019-05-12

## 2019-05-12 RX ORDER — GUAIFENESIN/DEXTROMETHORPHAN 100-10MG/5
10 SYRUP ORAL EVERY 6 HOURS PRN
Qty: 840 ML | COMMUNITY
Start: 2019-05-12

## 2019-05-12 RX ADMIN — AMPICILLIN SODIUM AND SULBACTAM SODIUM 3 G: 2; 1 INJECTION, POWDER, FOR SOLUTION INTRAMUSCULAR; INTRAVENOUS at 00:36

## 2019-05-12 RX ADMIN — DOXYCYCLINE 100 MG: 100 TABLET, FILM COATED ORAL at 05:46

## 2019-05-12 RX ADMIN — AMPICILLIN SODIUM AND SULBACTAM SODIUM 3 G: 2; 1 INJECTION, POWDER, FOR SOLUTION INTRAMUSCULAR; INTRAVENOUS at 05:45

## 2019-05-12 RX ADMIN — TRAMADOL HYDROCHLORIDE 50 MG: 50 TABLET, FILM COATED ORAL at 05:46

## 2019-05-12 RX ADMIN — SODIUM CHLORIDE 500 ML: 9 INJECTION, SOLUTION INTRAVENOUS at 00:36

## 2019-05-12 RX ADMIN — SENNOSIDES, DOCUSATE SODIUM 2 TABLET: 50; 8.6 TABLET, FILM COATED ORAL at 05:47

## 2019-05-12 RX ADMIN — GUAIFENESIN AND DEXTROMETHORPHAN 10 ML: 100; 10 SYRUP ORAL at 05:49

## 2019-05-12 NOTE — DISCHARGE INSTRUCTIONS
Discharge Instructions    Discharged to home by car with friend. Discharged via wheelchair, hospital escort: Yes.  Special equipment needed: Not Applicable    Be sure to schedule a follow-up appointment with your primary care doctor or any specialists as instructed.     Discharge Plan:   Diet Plan: Discussed  Activity Level: Discussed  Confirmed Follow up Appointment: Patient to Call and Schedule Appointment  Confirmed Symptoms Management: Discussed  Medication Reconciliation Updated: Yes  Influenza Vaccine Indication: Patient Refuses    I understand that a diet low in cholesterol, fat, and sodium is recommended for good health. Unless I have been given specific instructions below for another diet, I accept this instruction as my diet prescription.   Other diet: regular    Special Instructions: None    · Is patient discharged on Warfarin / Coumadin?   No     Depression / Suicide Risk    As you are discharged from this [facility] facility, it is important to learn how to keep safe from harming yourself.    Recognize the warning signs:  · Abrupt changes in personality, positive or negative- including increase in energy   · Giving away possessions  · Change in eating patterns- significant weight changes-  positive or negative  · Change in sleeping patterns- unable to sleep or sleeping all the time   · Unwillingness or inability to communicate  · Depression  · Unusual sadness, discouragement and loneliness  · Talk of wanting to die  · Neglect of personal appearance   · Rebelliousness- reckless behavior  · Withdrawal from people/activities they love  · Confusion- inability to concentrate     If you or a loved one observes any of these behaviors or has concerns about self-harm, here's what you can do:  · Talk about it- your feelings and reasons for harming yourself  · Remove any means that you might use to hurt yourself (examples: pills, rope, extension cords, firearm)  · Get professional help from the community  (Mental Health, Substance Abuse, psychological counseling)  · Do not be alone:Call your Safe Contact- someone whom you trust who will be there for you.  · Call your local CRISIS HOTLINE 041-5401 or 833-180-6298  · Call your local Children's Mobile Crisis Response Team [place] (810) 891-8118 or www.kooaba  · Call the toll free National Suicide Prevention Hotlines   · National Suicide Prevention Lifeline 539-568-ZHEM (3633)  · [employer] Hope Line Network 800-SUICIDE (292-2759)      Community-Acquired Pneumonia, Adult  Introduction  Pneumonia is an infection of the lungs. One type of pneumonia can happen while a person is in a hospital. A different type can happen when a person is not in a hospital (community-acquired pneumonia). It is easy for this kind to spread from person to person. It can spread to you if you breathe near an infected person who coughs or sneezes. Some symptoms include:  · A dry cough.  · A wet (productive) cough.  · Fever.  · Sweating.  · Chest pain.  Follow these instructions at home:  · Take over-the-counter and prescription medicines only as told by your doctor.  ¨ Only take cough medicine if you are losing sleep.  ¨ If you were prescribed an antibiotic medicine, take it as told by your doctor. Do not stop taking the antibiotic even if you start to feel better.  · Sleep with your head and neck raised (elevated). You can do this by putting a few pillows under your head, or you can sleep in a recliner.  · Do not use tobacco products. These include cigarettes, chewing tobacco, and e-cigarettes. If you need help quitting, ask your doctor.  · Drink enough water to keep your pee (urine) clear or pale yellow.  A shot (vaccine) can help prevent pneumonia. Shots are often suggested for:  · People older than 65 years of age.  · People older than 19 years of age:  ¨ Who are having cancer treatment.  ¨ Who have long-term (chronic) lung disease.  ¨ Who have problems with their body's defense  system (immune system).  You may also prevent pneumonia if you take these actions:  · Get the flu (influenza) shot every year.  · Go to the dentist as often as told.  · Wash your hands often. If soap and water are not available, use hand .  Contact a doctor if:  · You have a fever.  · You lose sleep because your cough medicine does not help.  Get help right away if:  · You are short of breath and it gets worse.  · You have more chest pain.  · Your sickness gets worse. This is very serious if:  ¨ You are an older adult.  ¨ Your body's defense system is weak.  · You cough up blood.  This information is not intended to replace advice given to you by your health care provider. Make sure you discuss any questions you have with your health care provider.  Document Released: 06/05/2009 Document Revised: 05/25/2017 Document Reviewed: 04/13/2016  © 2017 [employer]

## 2019-05-12 NOTE — DISCHARGE SUMMARY
Discharge Summary    CHIEF COMPLAINT ON ADMISSION  Chief Complaint   Patient presents with   • Abdominal Pain   • Flank Pain   • Fever       Reason for Admission  CAP (community acquired pneumonia)     Admission Date  5/10/2019    CODE STATUS  Full Code    HPI & HOSPITAL COURSE  This is a 49 y.o. male here with Abdominal Pain; Flank Pain; and Fever  Please review Dr. Debbie Gutierrez M.D. notes for further details of history of present illness, past medical/social/family histories, allergies and medications.     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  Febrile and tachycardic. Not hypoxic. Influenza screen negative. He improved on IV antibiotics, respiratory care and he did not require O2 on exertion or rest at discharge date. His leukocytosis resolved at discharge date. His back XR did not show any acute fracture or fluid collection. He will be transitioned to PO Augmentin and doxycycline for total 7d course of antibiotics. His blood pressures are actually low normal throughout the hospitalization so no indication for blood pressure medications.   At discharge date, Alexis Hart afebrile and hemodynamically stable.  Alexis Hart wanted to be discharged today.    Discharge Physical Exam  Constitutional: He appears well-developed.   HENT:   Head: Normocephalic and atraumatic.   Eyes: Conjunctivae are normal. No scleral icterus.   Neck: Normal range of motion. Neck supple.   Cardiovascular: Normal rate and regular rhythm.  Exam reveals no gallop and no friction rub.    No murmur heard.  Pulmonary/Chest: Effort normal and breath sounds normal. No respiratory distress. He has no wheezes. He has no rales.   Coughing, improved  Abdominal: Soft. Bowel sounds are normal. He exhibits no distension. There is no tenderness. There is no rebound and no guarding.    Musculoskeletal: He exhibits no edema or tenderness.   Neurological: He is alert.   Skin: Skin is warm. He is slightly malaised but better  Psychiatric: He has a normal mood and affect. His behavior is normal.     Imaging  DX-LUMBAR SPINE-2 OR 3 VIEWS   Final Result      1.  Mild lumbar spondylosis. No acute fracture or listhesis.   2.  Chronic anterior wedging of T12 vertebral body.   3.  Mild lumbar levoscoliosis.      CT-RENAL COLIC EVALUATION(A/P W/O)   Final Result      1.   No renal or ureteral calculus or obstruction.      2.  Right lower lobe pneumonitis.      DX-CHEST-PORTABLE (1 VIEW)   Final Result      No acute cardiopulmonary disease evident.                Therefore, he is discharged in good and stable condition to home with close outpatient follow-up.      Discharge Date  5/12/19    FOLLOW UP ITEMS POST DISCHARGE      DISCHARGE DIAGNOSES  Principal Problem:    Sepsis due to pneumonia (HCC) POA: Unknown  Active Problems:    CAP (community acquired pneumonia) POA: Unknown    Elevated blood pressure reading POA: Unknown    FOLLOW UP  Assign and follow up with primary provider or discharge clinic physician in 1 week. Can obtain follow CXR if needed in 1-2 weeks    MEDICATIONS ON DISCHARGE     Medication List      START taking these medications      Instructions   albuterol 108 (90 Base) MCG/ACT Aers inhalation aerosol   Inhale 2 Puffs by mouth every 6 hours as needed.  Dose:  2 Puff     amoxicillin-clavulanate 875-125 MG Tabs  Commonly known as:  AUGMENTIN   Take 1 Tab by mouth 2 times a day for 5 days.  Dose:  1 Tab     doxycycline monohydrate 100 MG tablet  Commonly known as:  ADOXA   Take 1 Tab by mouth every 12 hours for 5 days.  Dose:  100 mg     guaiFENesin dextromethorphan 100-10 MG/5ML Syrp syrup  Commonly known as:  ROBITUSSIN DM   Take 10 mL by mouth every 6 hours as needed for Cough.  Dose:  10 mL     polyethylene glycol/lytes Pack  Commonly known as:  MIRALAX   Take  by mouth 1 time daily as  needed (if sennosides and docusate ineffective after 24 hours).     Probiotic 250 MG Caps   Take 1 Cap by mouth 2 times a day, with meals.  Dose:  1 Cap     senna-docusate 8.6-50 MG Tabs  Commonly known as:  PERICOLACE or SENOKOT S   Doctor's comments:  STop if diarrhea  Take 2 Tabs by mouth 2 Times a Day.  Dose:  2 Tab        CONTINUE taking these medications      Instructions   acetaminophen 325 MG Tabs  Commonly known as:  TYLENOL   Take 325 mg by mouth every 6 hours as needed for Moderate Pain.  Dose:  325 mg     VITAMIN D PO   Take 1 Tab by mouth every day.  Dose:  1 Tab            Allergies  No Known Allergies    DIET  Orders Placed This Encounter   Procedures   • Diet Order Regular     Standing Status:   Standing     Number of Occurrences:   1     Order Specific Question:   Diet:     Answer:   Regular [1]       ACTIVITY  No havyy lifting or strenuous activities until he sees primary provider in a week    CONSULTATIONS      PROCEDURES  Ct-renal Colic Evaluation(a/p W/o)    Result Date: 5/10/2019  5/10/2019 5:18 PM HISTORY/REASON FOR EXAM:  ABDOMINAL PAIN; FLANK PAIN; FEVER. TECHNIQUE/EXAM DESCRIPTION AND NUMBER OF VIEWS: CT scan renal/colic without contrast. 5 mm helical images of the abdomen and pelvis were obtained from the diaphragmatic domes through the pubic symphysis. Low dose optimization technique was utilized for this CT exam including automated exposure control and adjustment of the mA and/or kV according to patient size. COMPARISON: None. FINDINGS: There is a moderate bilobulated ovoid opacity in the right lung base.     1.   No renal or ureteral calculus or obstruction. 2.  Right lower lobe pneumonitis.    Dx-chest-portable (1 View)    Result Date: 5/10/2019  5/10/2019 3:01 PM HISTORY/REASON FOR EXAM:  Abdominal and flank pain x2 days. TECHNIQUE/EXAM DESCRIPTION AND NUMBER OF VIEWS: Single portable view of the chest. COMPARISON: None FINDINGS: The soft tissues and bony structures are unremarkable.  The heart and mediastinal structures are within normal limits. Pulmonary vascularity is normal. The lung fields are clear. There is no effusion or pneumothorax.     No acute cardiopulmonary disease evident.    Dx-lumbar Spine-2 Or 3 Views    Result Date: 5/12/2019 5/11/2019 9:07 PM HISTORY/REASON FOR EXAM:  Low back pain. TECHNIQUE/ EXAM DESCRIPTION AND NUMBER OF VIEWS:  3 views of the lumbar spine. COMPARISON: None. FINDINGS: No acute fracture is detected. Preserved lumbar lordosis. Mild lumbar levoscoliosis. Mild multilevel spondylosis, with multilevel endplate spurring. Disc space heights are relatively preserved. Mild anterior wedging of T12 vertebral body, chronic. Mild lower lumbar facet hypertrophy.     1.  Mild lumbar spondylosis. No acute fracture or listhesis. 2.  Chronic anterior wedging of T12 vertebral body. 3.  Mild lumbar levoscoliosis.      LABORATORY  Lab Results   Component Value Date    SODIUM 132 (L) 05/11/2019    POTASSIUM 3.9 05/11/2019    CHLORIDE 103 05/11/2019    CO2 22 05/11/2019    GLUCOSE 92 05/11/2019    BUN 16 05/11/2019    CREATININE 0.94 05/11/2019        Lab Results   Component Value Date    WBC 9.2 05/12/2019    HEMOGLOBIN 13.6 (L) 05/12/2019    HEMATOCRIT 40.2 (L) 05/12/2019    PLATELETCT 181 05/12/2019        Total time of the discharge process exceeds 40 minutes.

## 2019-05-12 NOTE — PROGRESS NOTES
Awake, A&O, VSS, AZUL.  Doing well this am.  Patient up and walking around room.  Dressed in street clothes,  states MD stated he could go home.  Pain level low.  O2 sats remain greater than 90% on RA.  BM PTA, passing flatus.  PIV patent, SL.  POC discussed, pt agrees and verbalizes understanding.  Hourly rounding in place, call light is within reach.  Assessment completed as charted.

## 2019-05-12 NOTE — PROGRESS NOTES
Hospital Medicine Daily Progress Note    Date of Service  5/11/2019    Chief Complaint  49 y.o. 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  Febrile and tachycardic. Not hypoxic. Influenza screen negative.      Interval Problem Update  5/11. Still feeling malaised and ill.     Consultants/Specialty      Code Status  full    Disposition  home    Review of Systems  Review of Systems   Constitutional: Positive for chills, diaphoresis, fever and malaise/fatigue.   HENT: Negative for congestion, hearing loss and nosebleeds.    Eyes: Negative for pain and redness.   Respiratory: Negative for cough, hemoptysis, shortness of breath and wheezing.    Cardiovascular: Negative for chest pain and palpitations.   Gastrointestinal: Positive for nausea and vomiting. Negative for abdominal pain, blood in stool, constipation and diarrhea.   Genitourinary: Negative for dysuria, frequency and hematuria.   Musculoskeletal: Positive for back pain and myalgias. Negative for falls and joint pain.   Skin: Negative for rash.   Neurological: Positive for weakness. Negative for dizziness, tremors, focal weakness, seizures, loss of consciousness and headaches.   Psychiatric/Behavioral: The patient is not nervous/anxious and does not have insomnia.    All other systems reviewed and are negative.       Physical Exam  Temp:  [36.4 °C (97.6 °F)-39.6 °C (103.2 °F)] 36.8 °C (98.2 °F)  Pulse:  [] 70  Resp:  [16-26] 19  BP: ()/(56-96) 103/72  SpO2:  [91 %-98 %] 94 %    Physical Exam   Constitutional: He appears well-developed.   HENT:   Head: Normocephalic and atraumatic.   Eyes: Conjunctivae are normal. No scleral icterus.   Neck: Normal range of motion. Neck supple.   Cardiovascular: Normal  rate and regular rhythm.  Exam reveals no gallop and no friction rub.    No murmur heard.  Pulmonary/Chest: Effort normal and breath sounds normal. No respiratory distress. He has no wheezes. He has no rales.   Slightly tachypneic  Coughing   Abdominal: Soft. Bowel sounds are normal. He exhibits no distension. There is no tenderness. There is no rebound and no guarding.   Musculoskeletal: He exhibits no edema or tenderness.   Neurological: He is alert.   Skin: Skin is warm. He is diaphoretic (malaised).   Psychiatric: He has a normal mood and affect. His behavior is normal.       Fluids    Intake/Output Summary (Last 24 hours) at 05/11/19 1719  Last data filed at 05/11/19 1024   Gross per 24 hour   Intake             2560 ml   Output             2100 ml   Net              460 ml       Laboratory  Recent Labs      05/10/19   1510  05/11/19   0342   WBC  11.3*  18.7*   RBC  4.73  4.33*   HEMOGLOBIN  15.3  13.8*   HEMATOCRIT  44.9  41.8*   MCV  94.9  96.5   MCH  32.3  31.9   MCHC  34.1  33.0*   RDW  51.1*  53.7*   PLATELETCT  255  181   MPV  8.3*  8.5*     Recent Labs      05/10/19   1510  05/11/19   0342   SODIUM  134*  132*   POTASSIUM  3.9  3.9   CHLORIDE  100  103   CO2  26  22   GLUCOSE  112*  92   BUN  19  16   CREATININE  1.01  0.94   CALCIUM  9.3  8.1*                   Imaging  CT-RENAL COLIC EVALUATION(A/P W/O)   Final Result      1.   No renal or ureteral calculus or obstruction.      2.  Right lower lobe pneumonitis.      DX-CHEST-PORTABLE (1 VIEW)   Final Result      No acute cardiopulmonary disease evident.      DX-LUMBAR SPINE-2 OR 3 VIEWS    (Results Pending)        Assessment/Plan  * Sepsis due to pneumonia (HCC)   Assessment & Plan    Patient is not hypoxic, he has no hypotension, there is no evidence of endorgan damage.    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  See below.     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  Monitor with PRN IV antihypertensives for now, if he has continued need then we will plan to start him on standing medications.  NOw on the low normal side.          VTE prophylaxis: SCDs, ambulatory  Reviewed vitals, labs, imaging, staff notes.  Discussed assessment and plan with Alexis Hart  Discussed with ALEENA

## 2019-05-12 NOTE — CARE PLAN
Problem: Respiratory:  Goal: Respiratory status will improve  Patient's oxygen saturation is stable on room air.  Continuous pulse ox in use.

## 2019-05-12 NOTE — CARE PLAN
Problem: Pain Management  Goal: Pain level will decrease to patient's comfort goal  Patient medicated for pain to knees and back rated at 6/10.  Patient able to rest comfortably.

## 2019-05-13 LAB
BACTERIA SPEC RESP CULT: ABNORMAL
BACTERIA SPEC RESP CULT: ABNORMAL
GRAM STN SPEC: ABNORMAL
SIGNIFICANT IND 70042: ABNORMAL
SITE SITE: ABNORMAL
SOURCE SOURCE: ABNORMAL

## 2019-05-13 NOTE — PROGRESS NOTES
Discharge instructions discussed with patient.  All questions answered at this time.  IV removed, pt tolerated well.  Refused WC escort out.  Ambulated self to vehicle with a steady gait.  All personal belongings taken with patient.

## 2019-05-15 LAB
BACTERIA BLD CULT: NORMAL
BACTERIA BLD CULT: NORMAL
SIGNIFICANT IND 70042: NORMAL
SIGNIFICANT IND 70042: NORMAL
SITE SITE: NORMAL
SITE SITE: NORMAL
SOURCE SOURCE: NORMAL
SOURCE SOURCE: NORMAL

## 2019-06-01 ENCOUNTER — HOSPITAL ENCOUNTER (EMERGENCY)
Facility: MEDICAL CENTER | Age: 49
End: 2019-06-01
Attending: EMERGENCY MEDICINE
Payer: COMMERCIAL

## 2019-06-01 ENCOUNTER — NON-PROVIDER VISIT (OUTPATIENT)
Dept: OCCUPATIONAL MEDICINE | Facility: CLINIC | Age: 49
End: 2019-06-01

## 2019-06-01 VITALS
HEART RATE: 79 BPM | OXYGEN SATURATION: 99 % | TEMPERATURE: 97.1 F | WEIGHT: 277.78 LBS | SYSTOLIC BLOOD PRESSURE: 155 MMHG | DIASTOLIC BLOOD PRESSURE: 98 MMHG | BODY MASS INDEX: 50.81 KG/M2 | RESPIRATION RATE: 15 BRPM

## 2019-06-01 DIAGNOSIS — Z02.83 ENCOUNTER FOR DRUG SCREENING: ICD-10-CM

## 2019-06-01 DIAGNOSIS — S00.81XA FACIAL ABRASION, INITIAL ENCOUNTER: ICD-10-CM

## 2019-06-01 DIAGNOSIS — S02.2XXA CLOSED FRACTURE OF NASAL BONE, INITIAL ENCOUNTER: ICD-10-CM

## 2019-06-01 DIAGNOSIS — Z02.1 PRE-EMPLOYMENT DRUG SCREENING: ICD-10-CM

## 2019-06-01 PROCEDURE — 90471 IMMUNIZATION ADMIN: CPT

## 2019-06-01 PROCEDURE — 99283 EMERGENCY DEPT VISIT LOW MDM: CPT

## 2019-06-01 PROCEDURE — 700102 HCHG RX REV CODE 250 W/ 637 OVERRIDE(OP): Performed by: EMERGENCY MEDICINE

## 2019-06-01 PROCEDURE — 82075 ASSAY OF BREATH ETHANOL: CPT | Performed by: PREVENTIVE MEDICINE

## 2019-06-01 PROCEDURE — 8895 PB URINE 6 PANEL AFTER HOURS: Performed by: PREVENTIVE MEDICINE

## 2019-06-01 PROCEDURE — 700111 HCHG RX REV CODE 636 W/ 250 OVERRIDE (IP): Performed by: EMERGENCY MEDICINE

## 2019-06-01 PROCEDURE — 90715 TDAP VACCINE 7 YRS/> IM: CPT | Performed by: EMERGENCY MEDICINE

## 2019-06-01 PROCEDURE — 80305 DRUG TEST PRSMV DIR OPT OBS: CPT | Performed by: PREVENTIVE MEDICINE

## 2019-06-01 PROCEDURE — A9270 NON-COVERED ITEM OR SERVICE: HCPCS | Performed by: EMERGENCY MEDICINE

## 2019-06-01 RX ORDER — IBUPROFEN 600 MG/1
600 TABLET ORAL ONCE
Status: COMPLETED | OUTPATIENT
Start: 2019-06-01 | End: 2019-06-01

## 2019-06-01 RX ADMIN — CLOSTRIDIUM TETANI TOXOID ANTIGEN (FORMALDEHYDE INACTIVATED), CORYNEBACTERIUM DIPHTHERIAE TOXOID ANTIGEN (FORMALDEHYDE INACTIVATED), BORDETELLA PERTUSSIS TOXOID ANTIGEN (GLUTARALDEHYDE INACTIVATED), BORDETELLA PERTUSSIS FILAMENTOUS HEMAGGLUTININ ANTIGEN (FORMALDEHYDE INACTIVATED), BORDETELLA PERTUSSIS PERTACTIN ANTIGEN, AND BORDETELLA PERTUSSIS FIMBRIAE 2/3 ANTIGEN 0.5 ML: 5; 2; 2.5; 5; 3; 5 INJECTION, SUSPENSION INTRAMUSCULAR at 03:01

## 2019-06-01 RX ADMIN — IBUPROFEN 600 MG: 600 TABLET ORAL at 03:01

## 2019-06-01 ASSESSMENT — PAIN DESCRIPTION - DESCRIPTORS: DESCRIPTORS: SORE

## 2019-06-01 NOTE — DISCHARGE INSTRUCTIONS
"You were seen in the emergency department after a lid hit you in the nose while at work.  Your exam is reassuring.  You possibly have a small nasal bone fracture, however there is no surgery or other intervention for this.  The treatment is to let it heal and if you have ongoing deformity of the nose, he should see a plastic surgeon.    Your wounds are too small to suture closed.  Please keep them clean by using gentle soap and water.  Please do not use any harsh chemicals, such as peroxide or alcohol.    You have an injury where the mouth/nose connects to the eye socket. This places you at risk if the pressures in your mouth/nose increase. Certain precautions will assist healing and we ask that you faithfully follow these instructions:  1. Take the prescribed medications as directed.  2. Do not forcefully spit for several days.  3. Do not smoke.  4. Do not use straws for several days.  5. Do not forcefully blow your nose for at least 2 weeks, even though your sinus may feel \"stuffy\" or there may be some nasal drainage.  6. Try not to sneeze; it will cause undesired sinus pressure. If you must sneeze, keep your mouth open.  7. Eat only soft foods for several days, always trying to chew on the opposite side of your mouth.    Your tetanus was updated.    Please return to the emergency department or seek medical attention if you develop:  Fevers, spreading redness, double vision, severe progressive headache, vomiting, any other new or concerning findings  "

## 2019-06-01 NOTE — LETTER
FORM C-4:  EMPLOYEE’S CLAIM FOR COMPENSATION/ REPORT OF INITIAL TREATMENT  EMPLOYEE’S CLAIM - PROVIDE ALL INFORMATION REQUESTED   First Name  Alexis Last Name  Hailey Birthdate             Age  1970 49 y.o. Sex  male Claim Number   Home Employee Address  [address]                                     Zip  [address] Height    Weight  (!) 126 kg (277 lb 12.5 oz) N  [phone]   Mailing Employee Address                           [address]               Zip  [address] Telephone  [phone] (home)  Primary Language Spoken  ENGLISH   Insurer   Third Party   [employer] Employee's Occupation (Job Title) When Injury or Occupational Disease Occurred  [employer]   Employer's Name  [employer] Telephone  [phone]    Employer Address  [address]] Zip  [address]   Date of Injury  6/1/2019       Hour of Injury  12:00 AM Date Employer Notified  6/1/2019 Last Day of Work after Injury or Occupational Disease  6/1/2019 Supervisor to Whom Injury Reported  Saeid   Address or Location of Accident (if applicable)  [[facility]]   What were you doing at the time of accident? (if applicable)  n/a    How did this injury or occupational disease occur? Be specific and answer in detail. Use additional sheet if necessary)  try to hang-up water-Hose. tHe Lidcut loose + hit my forhead. Since that happen I feel dizzy plus pain.   If you believe that you have an occupational disease, when did you first have knowledge of the disability and it relationship to your employment?  n/a Witnesses to the Accident  n/a     Nature of Injury or Occupational Disease  Workers' Compensation  Part(s) of Body Injured or Affected  Skull, ,     I certify that the above is true and correct to the best of my knowledge and that I have provided this information in order to obtain the benefits of [place]  I hereby authorize any physician, chiropractor, surgeon, practitioner, or other person, any hospital, including [facility] hospital, any medical service organization, any insurance company, or other institution or organization to release to each other, any medical or other information, including benefits paid or payable, pertinent to this injury or disease, except information relative to diagnosis, treatment and/or counseling for AIDS, psychological conditions, alcohol or controlled substances, for which I must give specific authorization.  A Photostat of this authorization shall be as valid as the original.   Date Place   Employee’s Signature   THIS REPORT MUST BE COMPLETED AND MAILED WITHIN 3 WORKING DAYS OF TREATMENT   Place  [facility], EMERGENCY DEPT  Name of Facility   [facility]   Date  6/1/2019 Diagnosis  (S02.2XXA) Closed fracture of nasal bone, initial encounter  (S00.81XA) Facial abrasion, initial encounter Is there evidence the injured employee was under the influence of alcohol and/or another controlled substance at the time of accident?   Hour  3:11 AM Description of Injury or Disease  Closed fracture of nasal bone, initial encounter  Facial abrasion, initial encounter No   Treatment  Evaluation, tetanus booster  Have you advised the patient to remain off work five days or more?         No   X-Ray Findings      If Yes   From Date    To Date      From information given by the employee, together with medical evidence, can you directly connect this injury or occupational disease as job incurred?  Yes If No, is the employee capable of: Full Duty  Yes Modified Duty      Is additional medical care by a physician indicated?  Yes If Modified Duty, Specify any Limitations / Restrictions        Do you know of any previous injury or disease contributing to this condition or  "occupational disease?  No   Date  6/1/2019 Print Doctor’s Name  Bryan Russ I certify the employer’s copy of this form was mailed on:   Address  [address] [address] [phone] Insurer’s Use Only   [facility]  [address]    Provider’s Tax ID Number  [id number] Telephone  Dept: [phone]    Doctor’s Signature  e-BRYAN Burnette M.D. Degree   M.D.    Original - TREATING PHYSICIAN OR CHIROPRACTOR   Pg 2-Insurer/TPA   Pg 3-Employer   Pg 4-Employee                                                                                                  Form C-4 (rev01/03)     BRIEF DESCRIPTION OF RIGHTS AND BENEFITS  (Pursuant to [place] 616C.[place])    Notice of Injury or Occupational Disease (Incident Report Form C-1): If an injury or occupational disease (OD) arises out of and in the course of employment, you must provide written notice to your employer as soon as practicable, but no later than 7 days after the accident or OD. Your employer shall maintain a sufficient supply of the required forms.    Claim for Compensation (Form C-4): If medical treatment is sought, the form C-4 is available at the place of initial treatment. A completed \"Claim for Compensation\" (Form C-4) must be filed within 90 days after an accident or OD. The treating physician or chiropractor must, within 3 working days after treatment, complete and mail to the employer, the employer's insurer and third-party , the Claim for Compensation.    Medical Treatment: If you require medical treatment for your on-the-job injury or OD, you may be required to select a physician or chiropractor from a list provided by your workers’ compensation insurer, if it has contracted with an Organization for Managed Care (MCO) or Preferred Provider Organization (PPO) or providers of health care. If your employer has not entered into a contract with an MCO or PPO, you may select a physician or chiropractor from the Panel of " Physicians and Chiropractors. Any medical costs related to your industrial injury or OD will be paid by your insurer.    Temporary Total Disability (TTD): If your doctor has certified that you are unable to work for a period of at least 5 consecutive days, or 5 cumulative days in a 20-day period, or places restrictions on you that your employer does not accommodate, you may be entitled to TTD compensation.    Temporary Partial Disability (TPD): If the wage you receive upon reemployment is less than the compensation for TTD to which you are entitled, the insurer may be required to pay you TPD compensation to make up the difference. TPD can only be paid for a maximum of 24 months.    Permanent Partial Disability (PPD): When your medical condition is stable and there is an indication of a PPD as a result of your injury or OD, within 30 days, your insurer must arrange for an evaluation by a rating physician or chiropractor to determine the degree of your PPD. The amount of your PPD award depends on the date of injury, the results of the PPD evaluation and your age and wage.    Permanent Total Disability (PTD): If you are medically certified by a treating physician or chiropractor as permanently and totally disabled and have been granted a PTD status by your insurer, you are entitled to receive monthly benefits not to exceed 66 2/3% of your average monthly wage. The amount of your PTD payments is subject to reduction if you previously received a PPD award.    Vocational Rehabilitation Services: You may be eligible for vocational rehabilitation services if you are unable to return to the job due to a permanent physical impairment or permanent restrictions as a result of your injury or occupational disease.    Transportation and Per Skip Reimbursement: You may be eligible for travel expenses and per skip associated with medical treatment.  Reopening: You may be able to reopen your claim if your condition worsens after  claim closure.    Appeal Process: If you disagree with a written determination issued by the insurer or the insurer does not respond to your request, you may appeal to the Department of Administration, , by following the instructions contained in your determination letter. You must appeal the determination within 70 days from the date of the determination letter at [address], [address], [place], [place] [address], or [address], [address], [place], [place] [address]. If you disagree with the  decision, you may appeal to the Department of Administration, . You must file your appeal within 30 days from the date of the  decision letter at [address], [address], [place], [place] [address], or [address], [place], [place] [address]. If you disagree with a decision of an , you may file a petition for judicial review with the District Court. You must do so within 30 days of the Appeal Officer’s decision. You may be represented by an  at your own expense or you may contact the [facility] for possible representation.    [place]  for Injured Workers (NAIW): If you disagree with a  decision, you may request that NAIW represent you without charge at an  Hearing. For information regarding denial of benefits, you may contact the NA at: [address], [address], [place] [address], [phone], or [address], [place] [address], [phone]    To File a Complaint with the Division: If you wish to file a complaint with the  of the Division of Industrial Relations (DIR), please contact the Workers’ Compensation Section, [address], [place], [place] [address], telephone [phone], or [address], [place] [address], telephone [phone]) [phone].    For assistance  with Workers’ Compensation Issues: you may contact the Office of the Governor Consumer Health Assistance, [address] [place] [address], [place] [address], [place] [address], Toll Free 1-993.444.8404, Web site: http://govcha.[facility].nv., E-mail pop@[facility].nv.                                                                                                                                                                               __________________________________________________________________                                    _________________            Employee Name / Signature                                                                                                                            Date                                       D-2 (rev. 10/07)

## 2019-06-01 NOTE — ED PROVIDER NOTES
ED Provider Note    Scribed for Reji Russ M.D. by Daryn Dang. 6/1/2019,  2:36 AM.    Means of Arrival: walk-in  History obtained from: patient  History limited by: none    CHIEF COMPLAINT  Chief Complaint   Patient presents with   • Facial Pain       HPI  Alexis Hart is a 49 y.o. male who presents to the Emergency Department with complaints of facial pain that started earlier this morning. The patient states that he was at work at the [employer] when the lid of a big cooking kettle fell and hit him on the head. The patient associates headache. He denies epistaxis, or loss of consciousness. He took tylenol before reporting to the hospital to manage his pain.The patient denies any known medical allergies. The status of the patient's tetanus vaccination is unknown    REVIEW OF SYSTEMS  CONSTITUTIONAL:  No fever.  CARDIOVASCULAR:  No chest discomfort.  RESPIRATORY:  No pleuritic chest pain.  GASTROINTESTINAL:  No abdominal pain.  GENITOURINARY:   No dysuria.  MUSCULOSKELETAL:  No arthralgia.  SKIN:  No rash or suspicious lesions. Abrasions to the forehead  NEUROLOGIC:  No loss of consciousness. headache.   ENDOCRINE:  No facial edema.  HEMATOLOGIC:  No abnormal bleeding.     See HPI for further details.   All other systems are negative.     PAST MEDICAL HISTORY  History reviewed. No pertinent past medical history.    FAMILY HISTORY  History reviewed. No pertinent family history.    SOCIAL HISTORY   reports that he has never smoked. He uses smokeless tobacco. He reports that he does not drink alcohol or use drugs.    SURGICAL HISTORY  Past Surgical History:   Procedure Laterality Date   • KNEE ARTHROSCOPY Right        CURRENT MEDICATIONS  Home Medications    **Home medications have not yet been reviewed for this encounter**         ALLERGIES  No Known Allergies    PHYSICAL EXAM  VITAL SIGNS: /92   Pulse 80   Temp 36.2 °C (97.1 °F) (Temporal)   Resp 18   Wt (!) 126 kg (277 lb 12.5 oz)   SpO2 98%    BMI 50.81 kg/m²    Gen: Alert, no acute distress  HEENT: ATNC No nasal septal hematoma, No orbital tenderness or step offs, No hemotympanum   Eyes: PERRL, EOMI, normal conjunctiva.   Skin: There are abrasions over the forehead: 7 mm superficial laceration over the right eyebrow,  1.5 cm superficial lac on the left side of the bridge of the nose  Neck: trachea midline  Resp: no respiratory distress  CV: No JVD  Abd: non-distended  Ext: No deformities  Psych: normal mood  Neuro: speech fluent. CN 2 through 12 are intact      COURSE & MEDICAL DECISION MAKING  Pertinent Labs & Imaging studies reviewed. (See chart for details)    2:36 AM Patient seen and examined at bedside. Patient will be treated with Motrin 600 mg, ADACEL 0.5 mL IV for his symptoms. We discussed the need for follow up and strict return precautions were given. The patient was given the opportunity to ask question, all of which were answered. The patient is agreeable to discharge at this time.      Medical Decision Making:  Patient resents with superficial laceration was 5 do not believe require closure given her superficial nature.  No evidence of orbital blowout fractures, midface instability, intracranial injury, cervical spine injury.  Patient is unsure of his last tetanus, will update this.  CAT scan was discussed with the patient, however he declines.  This is reasonable as there is no immediate surgical indication or intervention that would be changed with a CAT scan given the patient's reassuring physical exam.  Patient will be discharged home to follow-up with his Worker's Comp. provider.    The patient will return for new or worsening symptoms and is stable at the time of discharge.    The patient is referred to a primary physician for blood pressure management, diabetic screening, and for all other preventative health concerns.      DISPOSITION:  Patient will be discharged home in stable condition.    FOLLOW UP:  No follow-up provider  specified.    OUTPATIENT MEDICATIONS:  New Prescriptions    No medications on file         FINAL IMPRESSION  1. Closed fracture of nasal bone, initial encounter    2. Facial abrasion, initial encounter            I, Daryn Dang (Scribe), am scribing for, and in the presence of, Reji Russ M.D..    Electronically signed by: Daryn Dang (Scribe), 6/1/2019    IReji M.D. personally performed the services described in this documentation, as scribed by Daryn Dang in my presence, and it is both accurate and complete.    The note accurately reflects work and decisions made by me.  Reji Russ  6/1/2019  3:10 AM

## 2019-06-01 NOTE — ED TRIAGE NOTES
Alexis Hart  49 y.o.  male  Chief Complaint   Patient presents with   • Facial Pain     Present to triage c/o abrasions on his nose and forehead s/p a large kettle lid fell on his face. Denies loc. Last tetanus shot unknown.

## 2019-06-03 LAB
AMP AMPHETAMINE: NORMAL
BREATH ALCOHOL COMMENT: NORMAL
COC COCAINE: NORMAL
INT CON NEG: NORMAL
INT CON POS: NORMAL
MET METHAMPHETAMINES: NORMAL
OPI OPIATES: NORMAL
PCP PHENCYCLIDINE: NORMAL
POC BREATHALIZER: 0 PERCENT (ref 0–0.01)
POC DRUG COMMENT 753798-OCCUPATIONAL HEALTH: NEGATIVE
THC: NORMAL

## 2019-06-11 LAB
SIN NOMBRE VIRUS 93385: NORMAL
SIN NOMBRE VIRUS IGM 93386: NORMAL